# Patient Record
(demographics unavailable — no encounter records)

---

## 2024-11-06 NOTE — REVIEW OF SYSTEMS
[Negative] : Heme/Lymph [Loss Of Hearing] : hearing loss [Sore Throat] : sore throat [As Noted in HPI] : as noted in HPI

## 2024-11-06 NOTE — PHYSICAL EXAM
[Alert] : alert [Sclera] : the sclera and conjunctiva were normal [Hearing Threshold Finger Rub Not [place]] : hearing was normal [Normal Appearance] : the appearance of the neck was normal [No Respiratory Distress] : no respiratory distress [Auscultation Breath Sounds / Voice Sounds] : lungs were clear to auscultation bilaterally [Heart Rate And Rhythm] : heart rate was normal and rhythm regular [Normal S1, S2] : normal S1 and S2 [None] : no edema [Bowel Sounds] : normal bowel sounds [Abdomen Tenderness] : non-tender [No Masses] : no abdominal mass palpated [Abdomen Soft] : soft [No CVA Tenderness] : no CVA  tenderness [Abnormal Walk] : normal gait [Normal Color / Pigmentation] : normal skin color and pigmentation [No Focal Deficits] : no focal deficits [Oriented To Time, Place, And Person] : oriented to person, place, and time

## 2024-11-06 NOTE — ASSESSMENT
[FreeTextEntry1] : 1.  Encounter for surveillance colonoscopy.  Colonoscopy in 2019 with tubular adenoma, hyperplastic polyps, diverticulosis, and hemorrhoids.  Recs: - Recent labs and prior colonoscopy reviewed. - Patient was advised to undergo colonoscopy- procedure, risks, benefits, and alternatives were explained. Patient agreeable. Brochure given. Suprep.

## 2025-01-15 NOTE — ASSESSMENT
[FreeTextEntry1] : Patient has recurrent low back pain and R elbow pain.  Referral given for patient to return to Ortho-Spine (Dr. Fleming) and general Ortho (Dr. Mckeon), as he has seen these physicians in the past.  He does not need pain medication at this time.  Referral given for patient to return to Dr. Gerber for his history of skin cancer.

## 2025-01-22 NOTE — ADDENDUM
[FreeTextEntry1] : I, Jimmy Gleason, documented this note as a scribe on behalf of Casey Fleming MD on 01/22/2025.

## 2025-01-22 NOTE — PHYSICAL EXAM
[de-identified] : Examination of the lumbar spine reveals no midline tenderness palpation, step-offs, or skin lesions.  Healed lumbar incision.  Decreased range of motion with respect to flexion, extension, lateral bending, and rotation. No tenderness to palpation of the sciatic notch. No tenderness palpation of the bilateral greater trochanters. No pain with passive internal/external rotation of the hips. No instability of bilateral lower extremities.  Negative HAO. Negative straight leg raise bilaterally. No bowstring. Negative femoral stretch. 5 out of 5 iliopsoas, hip abductors, hips adductors, quadriceps, hamstrings, gastrocsoleus, tibialis anterior, extensor hallucis longus, peroneals. Grossly intact sensation to light touch bilateral lower extremities. 1+ patellar and Achilles reflexes. Downgoing Babinski. No clonus. Intact proprioception. Palpable pulses. No skin lesion and no edema on the right and left lower extremities. [de-identified] : AP lateral lumbar X-rays with some spondylosis at L4-5.

## 2025-01-22 NOTE — DISCUSSION/SUMMARY
[de-identified] : We discussed further treatment options. He reports having done very well since his discectomy with me in 2017. He developed some recent back pain. This has since subsided. At this point, he would like to proceed with conservative measures. He will let me know of any changing or worsening of his symptoms.

## 2025-01-22 NOTE — END OF VISIT
[FreeTextEntry3] : All medical record entries made by the Scribe were at my, Casey Fleming MD, direction and personally dictated by me on 01/22/2025. I have reviewed the chart and agree that the record accurately reflects my personal performance of the history, physical exam, assessment and plan. I have also personally directed, reviewed, and agreed with the chart. [Time Spent: ___ minutes] : I have spent [unfilled] minutes of time on the encounter which excludes teaching and separately reported services.

## 2025-01-22 NOTE — HISTORY OF PRESENT ILLNESS
[de-identified] : Mr. FAVIO OVALLES  is a 57 year old male who presents with 1 week of low back pain after bending over trying on paints.  His pain is slightly improving.  Denies any LE radicular symptoms.  Normal bowel and bladder control.   Denies any recent fevers, chills, sweats, weight loss, or infection.  He had lumbar surgery in 2017 which he recovered well from.   The patients past medical history, past surgical history, medications, allergies, and social history were reviewed by me today with the patient and documented accordingly.  In addition, the patient's family history, which is noncontributory to their visit, was also reviewed.

## 2025-02-13 NOTE — ASSESSMENT
[FreeTextEntry1] : Patient needs a letter for his dentist stating that he needs implants.  Call placed to patient's Dental Office.

## 2025-02-13 NOTE — HISTORY OF PRESENT ILLNESS
[Telephone (audio)] : This telephonic visit was provided via audio only technology. [Verbal consent obtained from patient] : the patient, [unfilled] [de-identified] : Telehealth encounter conducted from 3:30PM - 3:36PM.  Patient says he needs 3-4 dental implants and a bridge.  He got a denial from his insurance which wanted him to get dentures.  He needs a letter from his dentist and PCP stating that the implants are necessary for proper eating in his case.  He is followed by [facility], [phone].

## 2025-02-25 NOTE — PHYSICAL EXAM
[de-identified] : +erythema over lower part of nose including tip, mildly warm.  No broken skin.  Mildly tender to touch.

## 2025-02-25 NOTE — HISTORY OF PRESENT ILLNESS
[FreeTextEntry8] : Patient developed a swelling on the nose 5 days ago.  It hurts to touch.  No fevers or chills.  No blood or pus drainage.  He pulled a hair off of the tip of the nose a month ago.  No other injury.  He takes Q-tips and rubs nostrils with Neosporin, and alcohol for the outer nose and nostrils.

## 2025-02-25 NOTE — ASSESSMENT
[FreeTextEntry1] : Patient with cellulitis of the nose.  Script given for cephalexin.  He should avoid injury to the area.  He can continue the Neosporin.  I advised him that the alcohol can be drying over time.  He requested an ENT referral in case the symptoms do not improve.  He was also given a Dermatology referral as follow-up from a prior scalp lesion.

## 2025-03-04 NOTE — ASSESSMENT
[FreeTextEntry1] : #Actinic Keratoses, 5-10, scalp  S/p Efudex previously  - We have discussed the nature and course of this condition - Given inflammatory nature of lesions above, will treat with cryosurgery - patient was verbally consented. 5-10 lesions were treated with liquid nitrogen f/t/f x2 cycles. Patient tolerated procedure well. - Side effects include blister formation, hypopigmentation, and scarring - Wound care with vaseline. - Can consider retreatment with Efudex if lesions persist at next visit   #Sunburn, upper back - Reassurance provided  - Recommended pt avoid sun or wear sunscreen SPF 30+ if out in the sun for prolonged period of time  RTC 3 months for TBSE

## 2025-03-04 NOTE — HISTORY OF PRESENT ILLNESS
[FreeTextEntry1] : fu - AKs [de-identified] : FAVIO OVALLES is a 55 year old male who presents for:  1) AKs of the scalp  - Previously used Efudex x6 weeks  - Reports persistent gritty papules on scalp   2) Rash, back - Reports he was recently out in the sun and may have gotten a sunburn   Brother with hx of skin cancer on ear Shx: works in [employer].

## 2025-03-04 NOTE — HISTORY OF PRESENT ILLNESS
[FreeTextEntry1] : fu - AKs [de-identified] : FAVIO OVALLES is a 55 year old male who presents for:  1) AKs of the scalp  - Previously used Efudex x6 weeks  - Reports persistent gritty papules on scalp   2) Rash, back - Reports he was recently out in the sun and may have gotten a sunburn   Brother with hx of skin cancer on ear Shx: works in [employer].

## 2025-03-04 NOTE — PHYSICAL EXAM
[Alert] : alert [Oriented x 3] : ~L oriented x 3 [Well Nourished] : well nourished [Conjunctiva Non-injected] : conjunctiva non-injected [No Visual Lymphadenopathy] : no visual  lymphadenopathy [No Clubbing] : no clubbing [No Edema] : no edema [No Bromhidrosis] : no bromhidrosis [No Chromhidrosis] : no chromhidrosis [FreeTextEntry3] : Focused exam only (see below) per patient request: - Several pink gritty papules on scalp  - Confluent erythema with thin overlying scale on upper back

## 2025-03-04 NOTE — HISTORY OF PRESENT ILLNESS
[FreeTextEntry1] : fu - AKs [de-identified] : FAVIO OVALLES is a 55 year old male who presents for:  1) AKs of the scalp  - Previously used Efudex x6 weeks  - Reports persistent gritty papules on scalp   2) Rash, back - Reports he was recently out in the sun and may have gotten a sunburn   Brother with hx of skin cancer on ear Shx: works in [employer].

## 2025-03-04 NOTE — PHYSICAL EXAM
[Alert] : alert [Oriented x 3] : ~L oriented x 3 [Well Nourished] : well nourished [Conjunctiva Non-injected] : conjunctiva non-injected [No Visual Lymphadenopathy] : no visual  lymphadenopathy [No Clubbing] : no clubbing [No Edema] : no edema [No Bromhidrosis] : no bromhidrosis [No Chromhidrosis] : no chromhidrosis [FreeTextEntry3] : Focused exam only (see below) per patient request: - Several pink gritty papules on scalp  - Confluent erythema with thin overlying scale on upper back     no

## 2025-04-30 NOTE — PHYSICAL EXAM
[Alert] : alert [Sclera] : the sclera and conjunctiva were normal [Hearing Threshold Finger Rub Not [place]] : hearing was normal [Normal Appearance] : the appearance of the neck was normal [No Respiratory Distress] : no respiratory distress [Auscultation Breath Sounds / Voice Sounds] : lungs were clear to auscultation bilaterally [None] : no edema [Abnormal Walk] : normal gait [Normal Color / Pigmentation] : normal skin color and pigmentation [No Focal Deficits] : no focal deficits [Oriented To Time, Place, And Person] : oriented to person, place, and time